# Patient Record
Sex: FEMALE | Race: WHITE | NOT HISPANIC OR LATINO
[De-identification: names, ages, dates, MRNs, and addresses within clinical notes are randomized per-mention and may not be internally consistent; named-entity substitution may affect disease eponyms.]

---

## 2017-01-23 PROBLEM — Z00.00 ENCOUNTER FOR PREVENTIVE HEALTH EXAMINATION: Status: ACTIVE | Noted: 2017-01-23

## 2017-02-01 ENCOUNTER — APPOINTMENT (OUTPATIENT)
Dept: VASCULAR SURGERY | Facility: CLINIC | Age: 57
End: 2017-02-01

## 2020-03-02 DIAGNOSIS — R42 DIZZINESS AND GIDDINESS: ICD-10-CM

## 2020-03-02 DIAGNOSIS — Z78.9 OTHER SPECIFIED HEALTH STATUS: ICD-10-CM

## 2020-03-02 DIAGNOSIS — Z82.49 FAMILY HISTORY OF ISCHEMIC HEART DISEASE AND OTHER DISEASES OF THE CIRCULATORY SYSTEM: ICD-10-CM

## 2020-03-02 RX ORDER — GINKGO BILOBA LEAF EXTRACT 120 MG
250 CAPSULE ORAL
Refills: 0 | Status: ACTIVE | COMMUNITY

## 2020-03-02 RX ORDER — MULTIVITAMIN
TABLET ORAL
Refills: 0 | Status: ACTIVE | COMMUNITY

## 2020-03-02 RX ORDER — ASPIRIN ENTERIC COATED TABLETS 81 MG 81 MG/1
81 TABLET, DELAYED RELEASE ORAL TWICE DAILY
Refills: 0 | Status: ACTIVE | COMMUNITY

## 2020-03-12 ENCOUNTER — APPOINTMENT (OUTPATIENT)
Dept: CARDIOLOGY | Facility: CLINIC | Age: 60
End: 2020-03-12
Payer: MEDICAID

## 2020-03-12 VITALS
WEIGHT: 166 LBS | BODY MASS INDEX: 25.16 KG/M2 | HEART RATE: 56 BPM | DIASTOLIC BLOOD PRESSURE: 72 MMHG | RESPIRATION RATE: 14 BRPM | HEIGHT: 68 IN | SYSTOLIC BLOOD PRESSURE: 112 MMHG

## 2020-03-12 DIAGNOSIS — R00.1 BRADYCARDIA, UNSPECIFIED: ICD-10-CM

## 2020-03-12 PROCEDURE — 99204 OFFICE O/P NEW MOD 45 MIN: CPT

## 2020-03-12 PROCEDURE — 93000 ELECTROCARDIOGRAM COMPLETE: CPT

## 2020-03-12 RX ORDER — MULTIVITAMIN/IRON/FOLIC ACID 18MG-0.4MG
1000 TABLET ORAL
Refills: 0 | Status: ACTIVE | COMMUNITY

## 2020-04-15 ENCOUNTER — APPOINTMENT (OUTPATIENT)
Dept: CARDIOLOGY | Facility: CLINIC | Age: 60
End: 2020-04-15

## 2020-04-24 ENCOUNTER — APPOINTMENT (OUTPATIENT)
Dept: CARDIOLOGY | Facility: CLINIC | Age: 60
End: 2020-04-24

## 2020-05-07 ENCOUNTER — APPOINTMENT (OUTPATIENT)
Dept: CARDIOLOGY | Facility: CLINIC | Age: 60
End: 2020-05-07

## 2020-07-07 ENCOUNTER — APPOINTMENT (OUTPATIENT)
Dept: CARDIOLOGY | Facility: CLINIC | Age: 60
End: 2020-07-07

## 2020-08-08 ENCOUNTER — APPOINTMENT (OUTPATIENT)
Dept: CARDIOLOGY | Facility: CLINIC | Age: 60
End: 2020-08-08
Payer: MEDICAID

## 2020-08-08 PROCEDURE — A9500: CPT

## 2020-08-08 PROCEDURE — 93015 CV STRESS TEST SUPVJ I&R: CPT

## 2020-08-08 PROCEDURE — 78452 HT MUSCLE IMAGE SPECT MULT: CPT

## 2020-08-08 RX ADMIN — AMINOPHYLLINE 3 MG/ML: 25 INJECTION, SOLUTION INTRAVENOUS at 00:00

## 2020-08-08 RX ADMIN — REGADENOSON 5 MG/5ML: 0.08 INJECTION, SOLUTION INTRAVENOUS at 00:00

## 2020-08-13 RX ORDER — AMINOPHYLLINE 25 MG/ML
25 INJECTION, SOLUTION INTRAVENOUS
Qty: 0 | Refills: 0 | Status: COMPLETED | OUTPATIENT
Start: 2020-08-08

## 2020-08-13 RX ORDER — REGADENOSON 0.08 MG/ML
0.4 INJECTION, SOLUTION INTRAVENOUS
Qty: 4 | Refills: 0 | Status: COMPLETED | OUTPATIENT
Start: 2020-08-08

## 2020-09-10 RX ORDER — KIT FOR THE PREPARATION OF TECHNETIUM TC99M SESTAMIBI 1 MG/5ML
INJECTION, POWDER, LYOPHILIZED, FOR SOLUTION PARENTERAL
Refills: 0 | Status: COMPLETED | OUTPATIENT
Start: 2020-09-10

## 2020-09-10 RX ADMIN — KIT FOR THE PREPARATION OF TECHNETIUM TC99M SESTAMIBI 0: 1 INJECTION, POWDER, LYOPHILIZED, FOR SOLUTION PARENTERAL at 00:00

## 2020-09-11 ENCOUNTER — APPOINTMENT (OUTPATIENT)
Dept: CARDIOLOGY | Facility: CLINIC | Age: 60
End: 2020-09-11
Payer: MEDICAID

## 2020-09-11 PROCEDURE — 93306 TTE W/DOPPLER COMPLETE: CPT

## 2020-10-29 ENCOUNTER — APPOINTMENT (OUTPATIENT)
Dept: CARDIOLOGY | Facility: CLINIC | Age: 60
End: 2020-10-29

## 2022-04-19 ENCOUNTER — APPOINTMENT (OUTPATIENT)
Dept: ORTHOPEDIC SURGERY | Facility: CLINIC | Age: 62
End: 2022-04-19
Payer: MEDICAID

## 2022-04-19 PROCEDURE — 99204 OFFICE O/P NEW MOD 45 MIN: CPT

## 2022-04-19 PROCEDURE — 73564 X-RAY EXAM KNEE 4 OR MORE: CPT | Mod: LT

## 2022-04-19 NOTE — PHYSICAL EXAM
[de-identified] : Neurologic: normal coordination, normal DTR UE/LE , normal sensation, normal mood and affect, orientated and able to communicate. \par Skin: normal skin, no rash, no ulcers and no lesions. \par Lymphatic: no obvious lymphadenopathy in areas examined. \par Constitutional: well developed and well nourished. \par Cardiovascular: peripheral vascular exam is grossly normal. \par Pulmonary: no respiratory distress, lungs clear to auscultation bilaterally. \par Abdomen: normal bowel sounds, non-tender, no HSM and no mass. \par \par Left Knee: Medial joint line tenderness\par Medial facet of patella pain\par Positive shelby's test medially\par X-ray of knee: 4 views:  grade 2 medial osteoarthritis bilaterally \par \par Right Knee: Medial joint line tenderness\par Medial facet of patella pain\par Positive shelby's test medially

## 2022-04-19 NOTE — DISCUSSION/SUMMARY
[de-identified] : Follow up after MRI of left knee to eval medial meniscus tear. Patient has had ongoing pain despite home exercise, rest, anti-inflammatories, corticosteroid injection, without improvement. \par \par Home Exercise\par The patient is instructed on a home exercise program.\par \par SHELLEY MOSLEY Acting as a Scribe for Dr. Bailey\par I, Shelley Mosley, attest that this documentation has been prepared under the direction and in the presence of Provider Mando Bailey MD.\par \par Activity Modification\par The patient was advised to modify their activities.\par \par Dx / Natural History\par The patient was advised of the diagnosis.  The natural history of the pathology was explained in full to the patient in layman's terms.  Several different treatment options were discussed and explained in full to the patient including the risks and benefits of both surgical and non-surgical treatments.  All questions and concerns were answered.\par \par Pain Guide Activities\par The patient was advised to let pain guide the gradual advancement of activities.\par \par RICE\par I explained to the patient that rest, ice, compression, and elevation would benefit them.  They may return to activity after follow-up or when they no longer have any pain.

## 2022-04-19 NOTE — HISTORY OF PRESENT ILLNESS
[de-identified] : The patient is a 61 year old L hand dominant F who presents today complaining of bilateral knee pain.  Admits to pain and stiffness after sitting too long, knees get swollen after standing too long, difficulty navigating steps, bending down. Says the right knee locked up on her a month ago when her knee was bent, able to straighten after a few minutes.  Temporary relief with biofreeze. factor V leiden\par Date of Injury/Onset: 03/2022\par Pain:    At Rest: 6/10 \par With Activity:  10/10 \par Mechanism of injury: none \par This is not a Work Related Injury being treated under Worker's Compensation.\par This is not an athletic injury occurring associated with an interscholastic or organized sports team.\par Quality of symptoms: throbbing, aching, locking, catching \par Improves with: Rest, Biofreeze , Ibuprofen \par Worse with: overuse , laying down \par Prior treatment: left knee mmr 2017, right knee mmr 2021-  Dr Zuleta at [facility]\par Prior Imaging: none \par Out of work/sport: _, since _\par School/Sport/Position/Occupation: House Cleaning \par Additional Information: None\par

## 2022-04-25 ENCOUNTER — APPOINTMENT (OUTPATIENT)
Dept: RHEUMATOLOGY | Facility: CLINIC | Age: 62
End: 2022-04-25
Payer: MEDICAID

## 2022-04-25 VITALS
BODY MASS INDEX: 26.52 KG/M2 | TEMPERATURE: 96.1 F | HEIGHT: 68 IN | SYSTOLIC BLOOD PRESSURE: 117 MMHG | WEIGHT: 175 LBS | OXYGEN SATURATION: 100 % | DIASTOLIC BLOOD PRESSURE: 71 MMHG | HEART RATE: 69 BPM

## 2022-04-25 DIAGNOSIS — Z74.09 OTHER REDUCED MOBILITY: ICD-10-CM

## 2022-04-25 PROCEDURE — 99204 OFFICE O/P NEW MOD 45 MIN: CPT | Mod: 25

## 2022-04-25 NOTE — ASSESSMENT
[FreeTextEntry1] : 62 y/o female referred to rheumatology for knee pain and stiffness.\par Pt reports b/l knee pains and locking, Pt reports swelling of entire leg with standing for few mins. Pt does have varicose veins. Pt reports L>R knee pain that wakes pt up from sleep. Reports warmth. Reports aching, throbbing. Pt has factor V Leiden and has  to walk around during work. Reports does not have major other joint issues.\par Pt takes ibuprofen 800mg and Tylenol PRN rarely for the pain.\par Pt had steroid injection of L knee 10/2020 and R knee 1/2021 with Dr. Zuleta (ortho).\par Pt had meniscus surgery of b/l knees (last R knee arthroscopy 5/2021).\par SOB with going up steps.\par \par Patient has L>R knee pain and locking in setting of previous meniscus tear and surgery with orthopedics. Pt does not have any other joint pains. Pt's underlying knee pathology is likely mechanical. However, pt does reports some swelling and warmth of knees (complicated by presence of significant varicose veins) and underlying autoimmune disease should be ruled out.\par \par - Obtain labwork to evaluate for signs of inflammatory arthritis\par - Pt is pending MR knee. Pt will contact me with results of MR. If mechanical derangements, pt should continue to follow up with orthopedics for discussion about conservative therapy vs. procedures/surgeries\par - Will contact pt with results of labwork and discuss MR findings. Will discuss next steps if signs of underlying autoimmuen rheum disease. Otherwise, RTC PRN.\par

## 2022-04-25 NOTE — HISTORY OF PRESENT ILLNESS
[FreeTextEntry1] : 60 y/o female referred to rheumatology for knee pain and stiffness.\par Pt reports b/l knee pains and locking, Pt reports swelling of entire leg with standing for few mins. Pt does have varicose veins. Pt reports L>R knee pain that wakes pt up from sleep. Reports warmth. Reports aching, throbbing. Pt has factor V Leiden and has  to walk around during work. Reports does not have major other joint issues.\par Pt takes ibuprofen 800mg and Tylenol PRN rarely for the pain.\par Pt had steroid injection of L knee 10/2020 and R knee 1/2021 with Dr. Zuleta (ortho).\par Pt had meniscus surgery of b/l knees (last R knee arthroscopy 5/2021).\par SOB with going up steps.\par \par Patient denies fever, nasopharyngeal ulcers, chest pain, abdominal pain, cough, SOB, nausea, vomiting, diarrhea, blood in stool, hematuria, rash, Raynaud's, dry eyes, dry mouth\par ROS negative unless otherwise noted above.\par \par

## 2022-04-26 LAB
ALBUMIN SERPL ELPH-MCNC: 4.7 G/DL
ALP BLD-CCNC: 57 U/L
ALT SERPL-CCNC: 19 U/L
ANION GAP SERPL CALC-SCNC: 10 MMOL/L
AST SERPL-CCNC: 17 U/L
BASOPHILS # BLD AUTO: 0.04 K/UL
BASOPHILS NFR BLD AUTO: 0.6 %
BILIRUB SERPL-MCNC: 0.4 MG/DL
BUN SERPL-MCNC: 15 MG/DL
C3 SERPL-MCNC: 120 MG/DL
C4 SERPL-MCNC: 26 MG/DL
CALCIUM SERPL-MCNC: 11 MG/DL
CCP AB SER IA-ACNC: <8 UNITS
CHLORIDE SERPL-SCNC: 104 MMOL/L
CO2 SERPL-SCNC: 28 MMOL/L
CREAT SERPL-MCNC: 0.63 MG/DL
CRP SERPL-MCNC: <3 MG/L
DSDNA AB SER-ACNC: <12 IU/ML
EGFR: 102 ML/MIN/1.73M2
ENA RNP AB SER IA-ACNC: <0.2 AL
ENA SM AB SER IA-ACNC: <0.2 AL
ENA SS-A AB SER IA-ACNC: <0.2 AL
ENA SS-B AB SER IA-ACNC: <0.2 AL
EOSINOPHIL # BLD AUTO: 0.19 K/UL
EOSINOPHIL NFR BLD AUTO: 3 %
ERYTHROCYTE [SEDIMENTATION RATE] IN BLOOD BY WESTERGREN METHOD: < 2 MM/HR
GLUCOSE SERPL-MCNC: 103 MG/DL
HCT VFR BLD CALC: 41.1 %
HGB BLD-MCNC: 12.9 G/DL
IMM GRANULOCYTES NFR BLD AUTO: 0.3 %
LYMPHOCYTES # BLD AUTO: 1.45 K/UL
LYMPHOCYTES NFR BLD AUTO: 22.7 %
MAN DIFF?: NORMAL
MCHC RBC-ENTMCNC: 28.7 PG
MCHC RBC-ENTMCNC: 31.4 GM/DL
MCV RBC AUTO: 91.3 FL
MONOCYTES # BLD AUTO: 0.42 K/UL
MONOCYTES NFR BLD AUTO: 6.6 %
NEUTROPHILS # BLD AUTO: 4.28 K/UL
NEUTROPHILS NFR BLD AUTO: 66.8 %
PLATELET # BLD AUTO: 299 K/UL
POTASSIUM SERPL-SCNC: 4.5 MMOL/L
PROT SERPL-MCNC: 6.5 G/DL
RBC # BLD: 4.5 M/UL
RBC # FLD: 14.8 %
RF+CCP IGG SER-IMP: NEGATIVE
RHEUMATOID FACT SER QL: <10 IU/ML
SODIUM SERPL-SCNC: 141 MMOL/L
WBC # FLD AUTO: 6.4 K/UL

## 2022-04-28 LAB
ANA PAT FLD IF-IMP: ABNORMAL
ANA SER IF-ACNC: ABNORMAL

## 2022-05-09 ENCOUNTER — NON-APPOINTMENT (OUTPATIENT)
Age: 62
End: 2022-05-09

## 2022-05-09 LAB — 14-3-3 ETA AG SER IA-MCNC: <0.2 NG/ML

## 2022-05-23 ENCOUNTER — RESULT REVIEW (OUTPATIENT)
Age: 62
End: 2022-05-23

## 2022-05-27 ENCOUNTER — APPOINTMENT (OUTPATIENT)
Dept: ORTHOPEDIC SURGERY | Facility: CLINIC | Age: 62
End: 2022-05-27
Payer: MEDICAID

## 2022-05-27 VITALS — BODY MASS INDEX: 26.01 KG/M2 | WEIGHT: 171.6 LBS | HEIGHT: 68 IN

## 2022-05-27 PROCEDURE — 99214 OFFICE O/P EST MOD 30 MIN: CPT

## 2022-05-27 NOTE — HISTORY OF PRESENT ILLNESS
[de-identified] : The patient is a 61 year old L hand dominant F who presents today complaining of bilateral knee pain. Admits to pain and stiffness after sitting too long, knees get swollen after standing too long, difficulty navigating steps, bending down. Says the right knee locked up on her a month ago when her knee was bent, able to straighten after a few minutes. Temporary relief with biofreeze. factor V leiden\par Date of Injury/Onset: 03/2022\par Pain: At Rest: 6/10 \par With Activity: 10/10 \par Mechanism of injury: none \par This is not a Work Related Injury being treated under Worker's Compensation.\par This is not an athletic injury occurring associated with an interscholastic or organized sports team.\par Quality of symptoms: throbbing, aching, locking, catching \par Improves with: Rest, Biofreeze , Ibuprofen \par Worse with: overuse , laying down \par Prior treatment: left knee mmr 2017, right knee mmr 2021- Dr Zuleta at [facility]\par Prior Imaging: MRI\par Out of work/sport: _, since _\par School/Sport/Position/Occupation: House Cleaning \par Additional Information: None\par  \par

## 2022-05-27 NOTE — PHYSICAL EXAM
[Right] : right knee [5___] : hamstring 5[unfilled]/5 [Positive] : positive Lizzy [de-identified] : Neurologic: normal coordination, normal DTR UE/LE , normal sensation, normal mood and affect, orientated and able to communicate. \par Skin: normal skin, no rash, no ulcers and no lesions. \par Lymphatic: no obvious lymphadenopathy in areas examined. \par Constitutional: well developed and well nourished. \par Cardiovascular: peripheral vascular exam is grossly normal. \par Pulmonary: no respiratory distress, lungs clear to auscultation bilaterally. \par Abdomen: normal bowel sounds, non-tender, no HSM and no mass. \par \par Left Knee: Medial joint line tenderness\par Medial facet of patella pain\par Positive shelby's test medially\par X-ray of knee: 4 views: grade 2 medial osteoarthritis bilaterally \par \par Right Knee: Medial joint line tenderness\par Medial facet of patella pain\par Positive shelby's test medially \par  [] : no calf tenderness

## 2022-05-27 NOTE — DISCUSSION/SUMMARY
[de-identified] : The patient has tried  physical therapy, anti-inflammatories, rest, RICE, all with no relief. Let this note serve as a letter of medical necessity for MRI. They will have a right knee MRI to evaluate for MMT. They will follow up with me after test.\par \par \par \par Left knee MMT surgery. \par Conservative treatment, nontreatment, nonsurgical intervention and surgical intervention treatment options have been reviewed with the patient. The patient continues to be symptomatic [and has failed conservative treatment], and elects to move forward with surgical intervention. The patient is indicated for LEFT KNEE ARTH PMM and all indicated procedures. As such the alternatives, benefits and risks, of the above procedure, including but not limited to bleeding, infection, neurovascular injury, loss of limb, loss of life, DVT, PE, RSD, inability to return to previous level of activity, inability to return to previous level of employment, advancement of or to osteoarthritic changes, joint instability or motion loss, hardware failure or migration, ] failure to resolve all symptoms, failure to return to sports and need for further procedures, as well as specific risk of re-tear and OA were discussed with the patient and/or their legal guardian who agreed to move forward with surgical intervention. They have reviewed and signed the consent form today after expressing understanding of the above documented conversation. The patient or their representative will contact my office as instructed on the preoperative instruction sheet they received today to schedule surgery in a timely manner as discussed.\par \par As a post-operative protocol, I am prescribing an iceless cold/heat compression therapy device for at home use to be used 3-5 times per day at 40 degrees for 35 days as an alternative to pain medication. I would like my patient to begin with simultaneous cold & compression therapy at 10mm pressure. At the patients follow up I will determine whether they should continue with cold, or if they should transition to contrast cold/heat compression therapy. Unlike a conventional cold therapy unit that requires ice, the ThermX iceless device is set to a prescribed temperature that it will remain throughout the entire duration of use, whether that be cold compression, heat compression, or contrast compression. Cold therapy units that depend on ice melt over a very short period and do not provide compression which limits the compliance and effectiveness for pain/inflammation reduction that I am targeting for my patient. I have reached out to [employer] [facility] to supply this device as they are the exclusive provider of the ThermX and the patient will be contacted and instructed on how to utilize the device.\par \par \par "Written by Luis Carlos Rodarte, acting as Scribe for Mando Bailey M.D" \par \par Home Exercise \par \par  The patient is instructed on a home exercise program. \par  \par RICE \par I explained to the patient that rest, ice, compression, and elevation would benefit them.  They may return to activity after follow-up or when they no longer have any pain. \par  \par Pain Guide Activities \par The patient was advised to let pain guide the gradual advancement of activities. \par  \par Activity Modification \par The patient was advised to modify their activities. \par  \par Dx / Natural History \par The patient was advised of the diagnosis.  The natural history of the pathology was explained in full to the patient in layman's terms.  Several different treatment options were discussed and explained in full to the patient including the risks and benefits of both surgical and non-surgical treatments.  All questions and concerns were answered.\par

## 2022-05-27 NOTE — DATA REVIEWED
[FreeTextEntry1] : 5/23/22 Lt knee MRI [facility]: Trace joint effusion.\par \par Inferior surface signal in the medial meniscus is unchanged compared to images\par from April 2021. The appearance is that of a chronic medial meniscus tear,\par however if there has been interval debridement, this could represent\par postoperative scarring. Correlate clinically.\par \par Small lateral meniscus tear, new compared to the previous MR study.\par \par Full thickness cartilage defect over the lateral femoral condyle, new compared\par to the April 2021 MR study. Mild chondromalacia over the medial femoral condyle,\par ----- Page Break ----- unchanged.\par \par \par Inferior surface signal in the medial meniscus is unchanged compared to images\par from April 2021. The appearance is that of a chronic medial meniscus tear,\par however if there has been interval debridement, this could represent\par postoperative scarring. Correlate clinically.\par \par Small lateral meniscus tear, new compared to the previous MR study.\par \par Full thickness cartilage defect over the lateral femoral condyle, new compared\par to the April 2021 MR study. Mild chondromalacia over the medial femoral condyle,\par ----- Page Break ----- unchanged.\par

## 2022-06-08 ENCOUNTER — RESULT REVIEW (OUTPATIENT)
Age: 62
End: 2022-06-08

## 2022-06-14 ENCOUNTER — APPOINTMENT (OUTPATIENT)
Dept: RHEUMATOLOGY | Facility: CLINIC | Age: 62
End: 2022-06-14

## 2022-06-21 ENCOUNTER — APPOINTMENT (OUTPATIENT)
Dept: ORTHOPEDIC SURGERY | Facility: CLINIC | Age: 62
End: 2022-06-21
Payer: MEDICAID

## 2022-06-21 PROCEDURE — 99214 OFFICE O/P EST MOD 30 MIN: CPT

## 2022-06-21 NOTE — ASSESSMENT
[FreeTextEntry1] : MRI RIGHT KNEE - Xander Delgado - 6/8/22\par IMPRESSION:\par 1. Status post partial lateral meniscectomy without evidence for a recurrent\par lateral meniscal tear. Comparison to prior exams recommended, however.\par 2. Mild pes anserine bursitis.\par 3. Cartilage wear as described.\par

## 2022-06-21 NOTE — PHYSICAL EXAM
[Right] : right knee [5___] : hamstring 5[unfilled]/5 [Positive] : positive Lizzy [de-identified] : Neurologic: normal coordination, normal DTR UE/LE , normal sensation, normal mood and affect, orientated and able to communicate. \par Skin: normal skin, no rash, no ulcers and no lesions. \par Lymphatic: no obvious lymphadenopathy in areas examined. \par Constitutional: well developed and well nourished. \par Cardiovascular: peripheral vascular exam is grossly normal. \par Pulmonary: no respiratory distress, lungs clear to auscultation bilaterally. \par Abdomen: normal bowel sounds, non-tender, no HSM and no mass. \par \par Left Knee: Medial joint line tenderness\par Medial facet of patella pain\par Positive shelby's test medially\par X-ray of knee: 4 views: grade 2 medial osteoarthritis bilaterally \par \par Right Knee: Medial joint line tenderness\par Medial facet of patella pain\par Positive shelby's test medially \par  [] : no calf tenderness

## 2022-06-21 NOTE — DISCUSSION/SUMMARY
[de-identified] : Reviewed MRI with patient\par Continue home strengthening \par Consider corticosteroid injection if pain worsens \par Provided home exercise packet with instructions for home strengthening and stretching. \par Prescribed Voltaren gel \par Follow up for planned left knee surgery \par \par \par -----------------------------------------------\par Home Exercise\par The patient is instructed on a home exercise program.\par \par SHELLEY MOSLEY Acting as a Scribe for Dr. Bailey\par I, Shelley Mosley, attest that this documentation has been prepared under the direction and in the presence of Provider Mando Bailey MD.\par \par Activity Modification\par The patient was advised to modify their activities.\par \par Dx / Natural History\par The patient was advised of the diagnosis.  The natural history of the pathology was explained in full to the patient in layman's terms.  Several different treatment options were discussed and explained in full to the patient including the risks and benefits of both surgical and non-surgical treatments.  All questions and concerns were answered.\par \par Pain Guide Activities\par The patient was advised to let pain guide the gradual advancement of activities.\par \par RICE\par I explained to the patient that rest, ice, compression, and elevation would benefit them.  They may return to activity after follow-up or when they no longer have any pain.

## 2022-06-21 NOTE — HISTORY OF PRESENT ILLNESS
[de-identified] : The patient is a 61 year old L hand dominant F who presents today complaining of bilateral knee pain. Admits to pain and stiffness after sitting too long, knees get swollen after standing too long, difficulty navigating steps, bending down. Says the right knee locked up on her a month ago when her knee was bent, able to straighten after a few minutes. Temporary relief with biofreeze. factor V leiden\par Date of Injury/Onset: 03/2022\par Pain: At Rest: 2/10 \par With Activity: 10/10 \par Mechanism of injury: none \par This is not a Work Related Injury being treated under Worker's Compensation.\par This is not an athletic injury occurring associated with an interscholastic or organized sports team.\par Quality of symptoms: throbbing, aching, locking, catching \par Improves with: Rest, Biofreeze , Ibuprofen \par Worse with: overuse , laying down \par Prior treatment: left knee mmr 2017, right knee mmr 2021- Dr Zuleta at [facility]\par Prior Imaging: MRI\par Out of work/sport: _, since _\par School/Sport/Position/Occupation: House Cleaning \par Additional Information: MRI follow up today

## 2022-07-01 ENCOUNTER — APPOINTMENT (OUTPATIENT)
Dept: ORTHOPEDIC SURGERY | Facility: HOSPITAL | Age: 62
End: 2022-07-01

## 2022-07-01 PROCEDURE — 29881 ARTHRS KNE SRG MNISECTMY M/L: CPT | Mod: AS,LT

## 2022-07-01 PROCEDURE — 29881 ARTHRS KNE SRG MNISECTMY M/L: CPT | Mod: LT

## 2022-07-01 RX ORDER — ONDANSETRON 4 MG/1
4 TABLET, ORALLY DISINTEGRATING ORAL EVERY 8 HOURS
Qty: 12 | Refills: 0 | Status: ACTIVE | COMMUNITY
Start: 2022-07-01 | End: 1900-01-01

## 2022-07-02 ENCOUNTER — TRANSCRIPTION ENCOUNTER (OUTPATIENT)
Age: 62
End: 2022-07-02

## 2022-07-12 ENCOUNTER — APPOINTMENT (OUTPATIENT)
Dept: ORTHOPEDIC SURGERY | Facility: CLINIC | Age: 62
End: 2022-07-12

## 2022-07-12 PROCEDURE — 20611 DRAIN/INJ JOINT/BURSA W/US: CPT | Mod: 58,RT

## 2022-07-12 PROCEDURE — 99024 POSTOP FOLLOW-UP VISIT: CPT

## 2022-07-12 NOTE — DISCUSSION/SUMMARY
[de-identified] : Inspected wound\par Removed sutures\par Applied steri-strips\par Reviewed all surgical images with patient and provided copies to take home\par Continue physical therapy\par Administered corticosteroid injection to R knee using ultrasound. Pain caused by R knee osteoarthritis. \par Follow up in 6 weeks\par \par Cortisone Knee Injection - Right\par The risks, benefits, and alternatives to cortisone injection were explained in full to the patient.  Risks outlined include but are not limited to infection, sepsis, bleeding, scarring, skin discoloration, temporary increase in pain, syncopal episode, failure to resolve symptoms, allergic reaction, symptom recurrence, and elevation of blood sugar in diabetics.  Patient understood the risks.  All questions were answered.  After discussion of options, patient requested an injection.  Oral informed consent was obtained and sterile prep was done of the injection site.  A mixture of 40mg of Kenalog, 2cc of 1% Lidocaine was sterilely prepared by me.  Sterile technique was used to introduce the mixture into the right knee. Ultrasound was used for proper needle placement.  Patient tolerated the procedure well.  Advised to ice the injection site this evening. \par \par -----------------------------------------------\par Home Exercise\par The patient is instructed on a home exercise program.\par \par SHELLEY MOSLEY Acting as a Scribe for Dr. Bailey\par I, Shelley Mosley, attest that this documentation has been prepared under the direction and in the presence of Provider Mando Bailey MD.\par \par Activity Modification\par The patient was advised to modify their activities.\par \par Dx / Natural History\par The patient was advised of the diagnosis.  The natural history of the pathology was explained in full to the patient in layman's terms.  Several different treatment options were discussed and explained in full to the patient including the risks and benefits of both surgical and non-surgical treatments.  All questions and concerns were answered.\par \par Pain Guide Activities\par The patient was advised to let pain guide the gradual advancement of activities.\par \par RICE\par I explained to the patient that rest, ice, compression, and elevation would benefit them.  They may return to activity after follow-up or when they no longer have any pain.

## 2022-07-12 NOTE — HISTORY OF PRESENT ILLNESS
[de-identified] :  The patient is s/p _  \par Date of Surgery: _\par Pain:    At Rest: _/10 \par With Activity:  _/10 \par Mechanism of injury: _\par This is _ a Work Related Injury being treated under Worker's Compensation.\par This is _ an athletic injury occurring associated with an interscholastic or organized sports team.\par Treatment/Imaging/Studies Since Last Visit: _\par 	Reports Available For Review Today: _\par Out of work/sport: _, since _\par School/Sport/Position/Occupation:_\par Change since last visit: \par Additional Information: None\par

## 2022-07-12 NOTE — PHYSICAL EXAM
[Right] : right knee [5___] : hamstring 5[unfilled]/5 [Positive] : positive Lizzy [de-identified] : Neurologic: normal coordination, normal DTR UE/LE , normal sensation, normal mood and affect, orientated and able to communicate. \par Skin: normal skin, no rash, no ulcers and no lesions. \par Lymphatic: no obvious lymphadenopathy in areas examined. \par Constitutional: well developed and well nourished. \par Cardiovascular: peripheral vascular exam is grossly normal. \par Pulmonary: no respiratory distress, lungs clear to auscultation bilaterally. \par Abdomen: normal bowel sounds, non-tender, no HSM and no mass. \par \par Left Knee: No effusion, clean and dry incisions, intact skin, no fluctuance, no sign of infection, no wound erythema, no induration, no drainage, sutures removed, steri-strips applied. \par X-ray of knee: 4 views: grade 2 medial osteoarthritis bilaterally \par \par Right Knee: Medial joint line tenderness\par Medial facet of patella pain\par Positive shelby's test medially \par  [] : no calf tenderness

## 2022-08-15 ENCOUNTER — APPOINTMENT (OUTPATIENT)
Dept: RHEUMATOLOGY | Facility: CLINIC | Age: 62
End: 2022-08-15

## 2022-08-30 ENCOUNTER — APPOINTMENT (OUTPATIENT)
Dept: ORTHOPEDIC SURGERY | Facility: CLINIC | Age: 62
End: 2022-08-30

## 2022-08-30 DIAGNOSIS — Z78.9 OTHER SPECIFIED HEALTH STATUS: ICD-10-CM

## 2022-08-30 PROCEDURE — 99024 POSTOP FOLLOW-UP VISIT: CPT

## 2022-08-30 NOTE — DISCUSSION/SUMMARY
[de-identified] : Continue physical therapy\par Patient reports "leg heaviness", reports history of lumbar spinal stenosis - referred patient to Dr. Leone \par Follow up as needed\par -----------------------------------------------\par Home Exercise\par The patient is instructed on a home exercise program.\par \par SHELLEY MOSLEY Acting as a Scribe for Dr. Bailey\par I, Shelley Mosley, attest that this documentation has been prepared under the direction and in the presence of Provider Mando Bailey MD.\par \par Activity Modification\par The patient was advised to modify their activities.\par \par Dx / Natural History\par The patient was advised of the diagnosis.  The natural history of the pathology was explained in full to the patient in layman's terms.  Several different treatment options were discussed and explained in full to the patient including the risks and benefits of both surgical and non-surgical treatments.  All questions and concerns were answered.\par \par Pain Guide Activities\par The patient was advised to let pain guide the gradual advancement of activities.\par \par RICE\par I explained to the patient that rest, ice, compression, and elevation would benefit them.  They may return to activity after follow-up or when they no longer have any pain.

## 2022-08-30 NOTE — PHYSICAL EXAM
[Right] : right knee [5___] : hamstring 5[unfilled]/5 [Positive] : positive Lizzy [de-identified] : Neurologic: normal coordination, normal DTR UE/LE , normal sensation, normal mood and affect, orientated and able to communicate. \par Skin: normal skin, no rash, no ulcers and no lesions. \par Lymphatic: no obvious lymphadenopathy in areas examined. \par Constitutional: well developed and well nourished. \par Cardiovascular: peripheral vascular exam is grossly normal. \par Pulmonary: no respiratory distress, lungs clear to auscultation bilaterally. \par Abdomen: normal bowel sounds, non-tender, no HSM and no mass. \par \par Left Knee:\par Mild medial joint line tenderness\par No effusion, clean and dry incisions, intact skin, no fluctuance, no sign of infection, no wound erythema, no induration, no drainage\par \par X-ray of knee: 4 views: grade 2 medial osteoarthritis bilaterally \par \par Right Knee: Medial joint line tenderness\par Medial facet of patella pain\par Positive shelby's test medially \par  [] : no calf tenderness

## 2022-08-30 NOTE — HISTORY OF PRESENT ILLNESS
[de-identified] : The patient is s/p _ \par Date of Surgery: 7/1/2022\par Pain: At Rest:  5/10 \par With Activity:   10/10 \par Mechanism of injury: N/A\par This is NOT a Work Related Injury being treated under Worker's Compensation.\par This is Nan athletic injury occurring associate W with an interscholastic or organized sports team.\par Treatment/Imaging/Studies Since Last Visit: N/A\par 	Reports Available For Review Today:  N/A \par Out of work/sport: _, since _\par School/Sport/Position/Occupation:_\par Change since last visit: \par Additional Information: None

## 2022-10-25 ENCOUNTER — APPOINTMENT (OUTPATIENT)
Dept: CARDIOLOGY | Facility: CLINIC | Age: 62
End: 2022-10-25

## 2022-11-16 ENCOUNTER — APPOINTMENT (OUTPATIENT)
Dept: ORTHOPEDIC SURGERY | Facility: CLINIC | Age: 62
End: 2022-11-16

## 2022-11-16 VITALS — BODY MASS INDEX: 25.91 KG/M2 | WEIGHT: 171 LBS | HEIGHT: 68 IN

## 2022-11-16 PROCEDURE — 99214 OFFICE O/P EST MOD 30 MIN: CPT

## 2022-11-16 PROCEDURE — 73564 X-RAY EXAM KNEE 4 OR MORE: CPT | Mod: RT

## 2022-11-16 NOTE — DISCUSSION/SUMMARY
[de-identified] : Patient was getting dressed and felt severe pain in her right knee, along with a loud "pop" sound. She has felt severe pain in right knee since then. She is having trouble walking and cannot sleep at night due to the pain. Her right knee pain is affecting her daily activities. \par \par Follow up after MRI of right knee to eval medial meniscus tear. \par -----------------------------------------------\par Home Exercise\par The patient is instructed on a home exercise program.\par \par SHELLEY MOSLEY Acting as a Scribe for Dr. Bailey\par I, Shelley Mosley, attest that this documentation has been prepared under the direction and in the presence of Provider Mando Bailey MD.\par \par Activity Modification\par The patient was advised to modify their activities.\par \par Dx / Natural History\par The patient was advised of the diagnosis.  The natural history of the pathology was explained in full to the patient in layman's terms.  Several different treatment options were discussed and explained in full to the patient including the risks and benefits of both surgical and non-surgical treatments.  All questions and concerns were answered.\par \par Pain Guide Activities\par The patient was advised to let pain guide the gradual advancement of activities.\par \par RICE\par I explained to the patient that rest, ice, compression, and elevation would benefit them.  They may return to activity after follow-up or when they no longer have any pain.

## 2022-11-16 NOTE — HISTORY OF PRESENT ILLNESS
[de-identified] : The patient is s/p L KNEE ARTHROSCOPIC PARTIAL MEDIAL MENISCECTOMY - RIGHT KNEE PAIN SINCE 11/9/22\par Date of Surgery: 7/1/2022\par Pain: At Rest: 5/10 \par With Activity: 10/10 \par Mechanism of injury: N/A\par This is NOT a Work Related Injury being treated under Worker's Compensation.\par This is Nan athletic injury occurring associate W with an interscholastic or organized sports team.\par Treatment/Imaging/Studies Since Last Visit: N/A\par 	Reports Available For Review Today: N/A \par Out of work/sport: _, since _\par School/Sport/Position/Occupation: \par Change since last visit: \par Additional Information: None

## 2022-11-16 NOTE — PHYSICAL EXAM
[Right] : right knee [5___] : hamstring 5[unfilled]/5 [Positive] : positive Lizzy [de-identified] : Neurologic: normal coordination, normal DTR UE/LE , normal sensation, normal mood and affect, orientated and able to communicate. \par Skin: normal skin, no rash, no ulcers and no lesions. \par Lymphatic: no obvious lymphadenopathy in areas examined. \par Constitutional: well developed and well nourished. \par Cardiovascular: peripheral vascular exam is grossly normal. \par Pulmonary: no respiratory distress, lungs clear to auscultation bilaterally. \par Abdomen: normal bowel sounds, non-tender, no HSM and no mass. \par \par Left Knee:\par Mild medial joint line tenderness\par No effusion, clean and dry incisions, intact skin, no fluctuance, no sign of infection, no wound erythema, no induration, no drainage\par \par X-ray of left knee: 4 views: grade 2 medial osteoarthritis bilaterally \par \par Right Knee: Medial joint line tenderness\par Medial facet of patella pain\par Positive shelby's test medially \par Right Knee X-Ray 4 views: Unremarkable  [] : no calf tenderness [FreeTextEntry8] : Medial Lisbet's test +

## 2022-11-17 ENCOUNTER — APPOINTMENT (OUTPATIENT)
Dept: CARDIOLOGY | Facility: CLINIC | Age: 62
End: 2022-11-17

## 2022-11-22 ENCOUNTER — RESULT REVIEW (OUTPATIENT)
Age: 62
End: 2022-11-22

## 2022-11-30 ENCOUNTER — APPOINTMENT (OUTPATIENT)
Dept: ORTHOPEDIC SURGERY | Facility: CLINIC | Age: 62
End: 2022-11-30

## 2022-11-30 PROCEDURE — 99214 OFFICE O/P EST MOD 30 MIN: CPT | Mod: 25

## 2022-11-30 PROCEDURE — 20611 DRAIN/INJ JOINT/BURSA W/US: CPT

## 2022-11-30 NOTE — DISCUSSION/SUMMARY
[de-identified] : Reviewed all images with patient. \par Provided home exercise packet with instructions for home strengthening and stretching.  \par Referred patient to physical therapy. \par \par her pain is all medial, MRI + for LMT, does not correlate with exam, would not benefit from arthroscopic procedure\par \par Ultrasound Guided Cortisone Knee Injection - Right\par RB&A to corticosteroid injection discussed.\par All questions were answered.\par Patient wishes to move forward with injection today. \par The risks, benefits, and alternatives to cortisone injection were explained in full to the patient.  Risks outlined include but are not limited to infection, sepsis, bleeding, scarring, skin discoloration, temporary increase in pain, syncopal episode, failure to resolve symptoms, allergic reaction, symptom recurrence, and elevation of blood sugar in diabetics.  Patient understood the risks.  All questions were answered.  After discussion of options, patient requested an injection.  Oral informed consent was obtained and sterile prep was done of the injection site.  A mixture of 40mg of Kenalog, 2cc of 1% Lidocaine was sterilely prepared by me.  Sterile technique was used to introduce the mixture into the right knee. Ultrasound was used for proper needle placement.  Patient tolerated the procedure well.  Advised to ice the injection site this evening. \par -----------------------------------------------\par Home Exercise\par The patient is instructed on a home exercise program.\par \par SHELLEY MOSLEY Acting as a Scribe for Dr. Bailey\par I, Shelley Mosley, attest that this documentation has been prepared under the direction and in the presence of Provider Mando Bailey MD.\par \par Activity Modification\par The patient was advised to modify their activities.\par \par Dx / Natural History\par The patient was advised of the diagnosis.  The natural history of the pathology was explained in full to the patient in layman's terms.  Several different treatment options were discussed and explained in full to the patient including the risks and benefits of both surgical and non-surgical treatments.  All questions and concerns were answered.\par \par Pain Guide Activities\par The patient was advised to let pain guide the gradual advancement of activities.\par \par RICE\par I explained to the patient that rest, ice, compression, and elevation would benefit them.  They may return to activity after follow-up or when they no longer have any pain.

## 2022-11-30 NOTE — ASSESSMENT
[FreeTextEntry1] : MRI RT KNEE ZP 11/22/22\par Impression:\par \par Status post previous partial lateral meniscectomy. Inferior articular surface of\par the posterior horn remnant is focally irregular abutting the posterior root\par attachment favoring recurrent low-grade undersurface tear. 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 Status post partial lateral meniscectomy without evidence for a recurrent\par lateral meniscal tear. Comparison to prior exams recommended, however.\par 2. Mild pes anserine bursitis.\par 3. Cartilage wear as described.\par --------\par 5/23/22 Lt knee MRI [facility]: Trace joint effusion.\par \par Inferior surface signal in the medial meniscus is unchanged compared to images\par from April 2021. The appearance is that of a chronic medial meniscus tear,\par however if there has been interval debridement, this could represent\par postoperative scarring. Correlate clinically.\par \par Small lateral meniscus tear, new compared to the previous MR study.\par \par Full thickness cartilage defect over the lateral femoral condyle, new compared\par to the April 2021 MR study. Mild chondromalacia over the medial femoral condyle, unchanged.\par \par

## 2022-11-30 NOTE — PHYSICAL EXAM
[Right] : right knee [5___] : hamstring 5[unfilled]/5 [Positive] : positive Lizzy [de-identified] : Neurologic: normal coordination, normal DTR UE/LE , normal sensation, normal mood and affect, orientated and able to communicate. \par Skin: normal skin, no rash, no ulcers and no lesions. \par Lymphatic: no obvious lymphadenopathy in areas examined. \par Constitutional: well developed and well nourished. \par Cardiovascular: peripheral vascular exam is grossly normal. \par Pulmonary: no respiratory distress, lungs clear to auscultation bilaterally. \par Abdomen: normal bowel sounds, non-tender, no HSM and no mass. \par \par Left Knee:\par Mild medial joint line tenderness\par No effusion, clean and dry incisions, intact skin, no fluctuance, no sign of infection, no wound erythema, no induration, no drainage\par \par X-ray of left knee: 4 views: grade 2 medial osteoarthritis bilaterally \par \par Right Knee: Medial joint line tenderness\par Medial facet of patella pain\par Positive shelby's test medially \par Right Knee X-Ray 4 views: Unremarkable  [] : no calf tenderness [FreeTextEntry8] : Medial Lisbet's test +

## 2022-11-30 NOTE — HISTORY OF PRESENT ILLNESS
[de-identified] : The patient is s/p L KNEE ARTHROSCOPIC PARTIAL MEDIAL MENISCECTOMY - RIGHT KNEE PAIN SINCE 11/9/22\par Date of Surgery: 7/1/2022\par Pain: At Rest: 5/10 \par With Activity: 10/10 \par Mechanism of injury: N/A\par This is NOT a Work Related Injury being treated under Worker's Compensation.\par This is Nan athletic injury occurring associate W with an interscholastic or organized sports team.\par Treatment/Imaging/Studies Since Last Visit: N/A\par 	Reports Available For Review Today: N/A \par Out of work/sport: _, since _\par School/Sport/Position/Occupation: \par Change since last visit: \par Additional Information: None

## 2022-12-08 ENCOUNTER — APPOINTMENT (OUTPATIENT)
Dept: CARDIOLOGY | Facility: CLINIC | Age: 62
End: 2022-12-08

## 2022-12-08 VITALS
HEART RATE: 86 BPM | BODY MASS INDEX: 23.04 KG/M2 | SYSTOLIC BLOOD PRESSURE: 124 MMHG | RESPIRATION RATE: 14 BRPM | HEIGHT: 68 IN | WEIGHT: 152 LBS | DIASTOLIC BLOOD PRESSURE: 68 MMHG

## 2022-12-08 DIAGNOSIS — D68.51 ACTIVATED PROTEIN C RESISTANCE: ICD-10-CM

## 2022-12-08 DIAGNOSIS — R06.09 OTHER FORMS OF DYSPNEA: ICD-10-CM

## 2022-12-08 DIAGNOSIS — R00.2 PALPITATIONS: ICD-10-CM

## 2022-12-08 DIAGNOSIS — E78.5 HYPERLIPIDEMIA, UNSPECIFIED: ICD-10-CM

## 2022-12-08 PROCEDURE — 99214 OFFICE O/P EST MOD 30 MIN: CPT | Mod: 25

## 2022-12-08 PROCEDURE — 93000 ELECTROCARDIOGRAM COMPLETE: CPT

## 2022-12-08 RX ORDER — CHOLECALCIFEROL (VITAMIN D3) 25 MCG
50 CAPSULE ORAL
Qty: 21 | Refills: 0 | Status: DISCONTINUED | COMMUNITY
Start: 2022-07-01 | End: 2022-12-08

## 2022-12-08 RX ORDER — GARLIC 200 MG
TABLET ORAL
Refills: 0 | Status: ACTIVE | COMMUNITY

## 2022-12-08 RX ORDER — DICLOFENAC SODIUM 1% 10 MG/G
1 GEL TOPICAL
Qty: 1 | Refills: 2 | Status: DISCONTINUED | COMMUNITY
Start: 2022-06-21 | End: 2022-12-08

## 2022-12-08 RX ORDER — GLUCOSAMINE/CHONDR SU A SOD 750-600 MG
TABLET ORAL
Refills: 0 | Status: DISCONTINUED | COMMUNITY
End: 2022-12-08

## 2022-12-08 RX ORDER — GLUC/MSM/COLGN2/HYAL/ANTIARTH3 375-375-20
TABLET ORAL
Refills: 0 | Status: ACTIVE | COMMUNITY

## 2022-12-16 ENCOUNTER — APPOINTMENT (OUTPATIENT)
Dept: CARDIOLOGY | Facility: CLINIC | Age: 62
End: 2022-12-16

## 2022-12-16 PROCEDURE — 93306 TTE W/DOPPLER COMPLETE: CPT

## 2023-01-10 ENCOUNTER — APPOINTMENT (OUTPATIENT)
Dept: ORTHOPEDIC SURGERY | Facility: CLINIC | Age: 63
End: 2023-01-10

## 2023-01-19 ENCOUNTER — APPOINTMENT (OUTPATIENT)
Dept: CARDIOLOGY | Facility: CLINIC | Age: 63
End: 2023-01-19

## 2023-03-07 ENCOUNTER — APPOINTMENT (OUTPATIENT)
Dept: ORTHOPEDIC SURGERY | Facility: CLINIC | Age: 63
End: 2023-03-07
Payer: MEDICAID

## 2023-03-07 DIAGNOSIS — M25.562 PAIN IN LEFT KNEE: ICD-10-CM

## 2023-03-07 DIAGNOSIS — M17.0 BILATERAL PRIMARY OSTEOARTHRITIS OF KNEE: ICD-10-CM

## 2023-03-07 DIAGNOSIS — M25.561 PAIN IN RIGHT KNEE: ICD-10-CM

## 2023-03-07 PROCEDURE — 99214 OFFICE O/P EST MOD 30 MIN: CPT | Mod: 25

## 2023-03-07 PROCEDURE — 20611 DRAIN/INJ JOINT/BURSA W/US: CPT

## 2023-03-07 NOTE — PHYSICAL EXAM
[Right] : right knee [5___] : hamstring 5[unfilled]/5 [Positive] : positive Lizzy [de-identified] : Neurologic: normal coordination, normal DTR UE/LE , normal sensation, normal mood and affect, orientated and able to communicate. \par Skin: normal skin, no rash, no ulcers and no lesions. \par Lymphatic: no obvious lymphadenopathy in areas examined. \par Constitutional: well developed and well nourished. \par Cardiovascular: peripheral vascular exam is grossly normal. \par Pulmonary: no respiratory distress, lungs clear to auscultation bilaterally. \par Abdomen: normal bowel sounds, non-tender, no HSM and no mass. \par \par Left Knee:\par Mild medial joint line tenderness\par No effusion, clean and dry incisions, intact skin, no fluctuance, no sign of infection, no wound erythema, no induration, no drainage\par \par X-ray of left knee: 4 views: grade 2 medial osteoarthritis bilaterally \par \par Right Knee: Medial joint line tenderness\par Medial facet of patella pain\par Positive shelby's test medially \par Right Knee X-Ray 4 views: Unremarkable  [] : no calf tenderness [FreeTextEntry8] : Medial Lisbet's test +

## 2023-03-07 NOTE — HISTORY OF PRESENT ILLNESS
[de-identified] : History of Present Illness \par The patient is s/p L KNEE ARTHROSCOPIC PARTIAL MEDIAL MENISCECTOMY - RIGHT KNEE PAIN SINCE 11/9/22\par Date of Surgery: 7/1/2022\par Pain: At Rest: 2/10 \par With Activity: 2-3/10 \par Mechanism of injury: N/A\par This is NOT a Work Related Injury being treated under Worker's Compensation.\par This is Nan athletic injury occurring associate W with an interscholastic or organized sports team.\par Treatment/Imaging/Studies Since Last Visit: N/A\par 	Reports Available For Review Today: N/A \par Out of work/sport: _, since _\par School/Sport/Position/Occupation: \par Change since last visit: \par Additional Information: patient has mri since last visit , patient does also state she took a fall landing on her right knee. \par

## 2023-03-07 NOTE — DISCUSSION/SUMMARY
[de-identified] : Continue home exercise \par \par *her pain is all medial, MRI + for LMT, does not correlate with exam, would not benefit from arthroscopic procedure\par \par Ultrasound Guided Cortisone Knee Injection - Right\par RB&A to corticosteroid injection discussed.\par All questions were answered.\par \par Patient wishes to move forward with injection today. \par The risks, benefits, and alternatives to cortisone injection were explained in full to the patient.  Risks outlined include but are not limited to infection, sepsis, bleeding, scarring, skin discoloration, temporary increase in pain, syncopal episode, failure to resolve symptoms, allergic reaction, symptom recurrence, and elevation of blood sugar in diabetics.  Patient understood the risks.  All questions were answered.  After discussion of options, patient requested an injection.  Oral informed consent was obtained and sterile prep was done of the injection site.  A mixture of 40mg of Kenalog, 2cc of 1% Lidocaine was sterilely prepared by me.  Sterile technique was used to introduce the mixture into the right knee. Ultrasound was used for proper needle placement.  Patient tolerated the procedure well.  Advised to ice the injection site this evening. \par -----------------------------------------------\par Home Exercise\par The patient is instructed on a home exercise program.\par \par SHELLEY MOSLEY Acting as a Scribe for Dr. Bailey\par I, Shelley Mosley, attest that this documentation has been prepared under the direction and in the presence of Provider Mando Bailey MD.\par \par Activity Modification\par The patient was advised to modify their activities.\par \par Dx / Natural History\par The patient was advised of the diagnosis.  The natural history of the pathology was explained in full to the patient in layman's terms.  Several different treatment options were discussed and explained in full to the patient including the risks and benefits of both surgical and non-surgical treatments.  All questions and concerns were answered.\par \par Pain Guide Activities\par The patient was advised to let pain guide the gradual advancement of activities.\par \par RICE\par I explained to the patient that rest, ice, compression, and elevation would benefit them.  They may return to activity after follow-up or when they no longer have any pain.